# Patient Record
Sex: MALE | Race: WHITE | Employment: OTHER | URBAN - METROPOLITAN AREA
[De-identification: names, ages, dates, MRNs, and addresses within clinical notes are randomized per-mention and may not be internally consistent; named-entity substitution may affect disease eponyms.]

---

## 2023-12-14 ENCOUNTER — APPOINTMENT (OUTPATIENT)
Dept: CARDIOLOGY | Facility: HOSPITAL | Age: 63
End: 2023-12-14
Payer: COMMERCIAL

## 2023-12-14 ENCOUNTER — HOSPITAL ENCOUNTER (EMERGENCY)
Facility: HOSPITAL | Age: 63
Discharge: HOME | End: 2023-12-14
Attending: EMERGENCY MEDICINE
Payer: COMMERCIAL

## 2023-12-14 ENCOUNTER — APPOINTMENT (OUTPATIENT)
Dept: RADIOLOGY | Facility: HOSPITAL | Age: 63
End: 2023-12-14
Payer: COMMERCIAL

## 2023-12-14 VITALS
DIASTOLIC BLOOD PRESSURE: 84 MMHG | HEART RATE: 85 BPM | TEMPERATURE: 97.6 F | SYSTOLIC BLOOD PRESSURE: 136 MMHG | WEIGHT: 190 LBS | RESPIRATION RATE: 18 BRPM | BODY MASS INDEX: 25.73 KG/M2 | OXYGEN SATURATION: 97 % | HEIGHT: 72 IN

## 2023-12-14 DIAGNOSIS — R55 SYNCOPE, UNSPECIFIED SYNCOPE TYPE: Primary | ICD-10-CM

## 2023-12-14 DIAGNOSIS — N28.1 RENAL CYST: ICD-10-CM

## 2023-12-14 DIAGNOSIS — K76.89 HEPATIC CYST: ICD-10-CM

## 2023-12-14 LAB
ALBUMIN SERPL BCP-MCNC: 4.3 G/DL (ref 3.4–5)
ALP SERPL-CCNC: 64 U/L (ref 33–136)
ALT SERPL W P-5'-P-CCNC: 88 U/L (ref 10–52)
ANION GAP SERPL CALC-SCNC: 15 MMOL/L (ref 10–20)
AST SERPL W P-5'-P-CCNC: 47 U/L (ref 9–39)
BASOPHILS # BLD AUTO: 0.03 X10*3/UL (ref 0–0.1)
BASOPHILS NFR BLD AUTO: 0.4 %
BILIRUB DIRECT SERPL-MCNC: 0.1 MG/DL (ref 0–0.3)
BILIRUB SERPL-MCNC: 0.8 MG/DL (ref 0–1.2)
BUN SERPL-MCNC: 15 MG/DL (ref 6–23)
CALCIUM SERPL-MCNC: 9.2 MG/DL (ref 8.6–10.3)
CARDIAC TROPONIN I PNL SERPL HS: 4 NG/L (ref 0–20)
CHLORIDE SERPL-SCNC: 102 MMOL/L (ref 98–107)
CO2 SERPL-SCNC: 26 MMOL/L (ref 21–32)
CREAT SERPL-MCNC: 0.93 MG/DL (ref 0.5–1.3)
EOSINOPHIL # BLD AUTO: 0.09 X10*3/UL (ref 0–0.7)
EOSINOPHIL NFR BLD AUTO: 1.3 %
ERYTHROCYTE [DISTWIDTH] IN BLOOD BY AUTOMATED COUNT: 12.2 % (ref 11.5–14.5)
GFR SERPL CREATININE-BSD FRML MDRD: >90 ML/MIN/1.73M*2
GLUCOSE SERPL-MCNC: 157 MG/DL (ref 74–99)
HCT VFR BLD AUTO: 40.6 % (ref 41–52)
HGB BLD-MCNC: 14.2 G/DL (ref 13.5–17.5)
IMM GRANULOCYTES # BLD AUTO: 0.01 X10*3/UL (ref 0–0.7)
IMM GRANULOCYTES NFR BLD AUTO: 0.1 % (ref 0–0.9)
LYMPHOCYTES # BLD AUTO: 2.26 X10*3/UL (ref 1.2–4.8)
LYMPHOCYTES NFR BLD AUTO: 33.8 %
MCH RBC QN AUTO: 28.7 PG (ref 26–34)
MCHC RBC AUTO-ENTMCNC: 35 G/DL (ref 32–36)
MCV RBC AUTO: 82 FL (ref 80–100)
MONOCYTES # BLD AUTO: 0.51 X10*3/UL (ref 0.1–1)
MONOCYTES NFR BLD AUTO: 7.6 %
NEUTROPHILS # BLD AUTO: 3.78 X10*3/UL (ref 1.2–7.7)
NEUTROPHILS NFR BLD AUTO: 56.8 %
NRBC BLD-RTO: 0 /100 WBCS (ref 0–0)
PLATELET # BLD AUTO: 243 X10*3/UL (ref 150–450)
POTASSIUM SERPL-SCNC: 3.5 MMOL/L (ref 3.5–5.3)
PROT SERPL-MCNC: 7.3 G/DL (ref 6.4–8.2)
RBC # BLD AUTO: 4.94 X10*6/UL (ref 4.5–5.9)
SODIUM SERPL-SCNC: 139 MMOL/L (ref 136–145)
WBC # BLD AUTO: 6.7 X10*3/UL (ref 4.4–11.3)

## 2023-12-14 PROCEDURE — 2550000001 HC RX 255 CONTRASTS: Performed by: EMERGENCY MEDICINE

## 2023-12-14 PROCEDURE — 74177 CT ABD & PELVIS W/CONTRAST: CPT

## 2023-12-14 PROCEDURE — 84484 ASSAY OF TROPONIN QUANT: CPT | Performed by: EMERGENCY MEDICINE

## 2023-12-14 PROCEDURE — 82248 BILIRUBIN DIRECT: CPT | Performed by: EMERGENCY MEDICINE

## 2023-12-14 PROCEDURE — 99284 EMERGENCY DEPT VISIT MOD MDM: CPT | Mod: 25 | Performed by: EMERGENCY MEDICINE

## 2023-12-14 PROCEDURE — 93005 ELECTROCARDIOGRAM TRACING: CPT

## 2023-12-14 PROCEDURE — 85025 COMPLETE CBC W/AUTO DIFF WBC: CPT | Performed by: EMERGENCY MEDICINE

## 2023-12-14 PROCEDURE — 80048 BASIC METABOLIC PNL TOTAL CA: CPT | Performed by: EMERGENCY MEDICINE

## 2023-12-14 PROCEDURE — 36415 COLL VENOUS BLD VENIPUNCTURE: CPT | Performed by: EMERGENCY MEDICINE

## 2023-12-14 PROCEDURE — 74177 CT ABD & PELVIS W/CONTRAST: CPT | Performed by: STUDENT IN AN ORGANIZED HEALTH CARE EDUCATION/TRAINING PROGRAM

## 2023-12-14 RX ADMIN — IOHEXOL 75 ML: 350 INJECTION, SOLUTION INTRAVENOUS at 20:43

## 2023-12-14 ASSESSMENT — COLUMBIA-SUICIDE SEVERITY RATING SCALE - C-SSRS
6. HAVE YOU EVER DONE ANYTHING, STARTED TO DO ANYTHING, OR PREPARED TO DO ANYTHING TO END YOUR LIFE?: NO
2. HAVE YOU ACTUALLY HAD ANY THOUGHTS OF KILLING YOURSELF?: NO
1. IN THE PAST MONTH, HAVE YOU WISHED YOU WERE DEAD OR WISHED YOU COULD GO TO SLEEP AND NOT WAKE UP?: NO

## 2023-12-15 NOTE — ED PROVIDER NOTES
Chief Complaint   Patient presents with    Syncope     Pt was shopping with his wife, became dizzy/diaphoretic and was lowered to the floor, pt denies every losing consciousness, per EMS CPR may have been initiated by someone but pt does remember entire event and and denies that this happened. Pt denies CP/SOB, states he is feeling better at this time      History of Present Illness   63-year-old presents for evaluation of near syncope/syncope.  Patient reports that he was sitting down waiting for his wife who was shopping when he all of a sudden became severely diaphoretic and lightheaded and experienced lower abdominal discomfort.  No associated chest pain or shortness of breath.  Not entirely sure whether he completely lost consciousness.  Currently states that he feels not entirely back to normal but greatly improved compared to earlier.  Reports history of hypertension and hyperlipidemia however states that he is in a very good and physical shape and typically can ride a bike 40 miles without difficulty.  Wearing a Fitbit device and reports that his heart rate while sitting down around this time was greater than 120 bpm compared to his usual resting heart rate of 60s.    Physical Exam     ED Triage Vitals [12/14/23 1827]   Temp Heart Rate Resp BP   36.4 °C (97.6 °F) 98 18 (!) 133/93      SpO2 Temp src Heart Rate Source Patient Position   97 % -- -- --      BP Location FiO2 (%)     -- --         General:  No acute distress.  Head: Atraumatic.  Eyes: No conjunctival injection.   Ears Nose Throat: Hearing grossly intact. No epistaxis. Oral mucosa moist.  Neck: Supple.  Cardiovascular: Extremities warm.  Regular rhythm.  No murmur.  No lower leg edema.  Pulmonary: No stridor. Normal respiratory effort.  Lungs clear.  Abdominal: No abdominal distention.  Abdomen soft and nontender.  Musculoskeletal: No deformity or joint swelling.  Skin: No rash.  Psychiatric: Does not appear to respond to internal  stimuli.  Neurologic: Alert. Follows directions. Face symmetric. No aphasia or dysarthria. Symmetric movement of upper and lower extremities.    ED Course & Medical Decision Making   Vital signs within normal limits.  Patient well-appearing and asymptomatic.  Labs demonstrated mild hyperglycemia glucose 157 otherwise normal metabolic panel.  ALT and AST very mildly elevated but bilirubin and direct bilirubin normal.  High-sensitivity troponin negative.  In setting of reported lower abdominal discomfort and near syncope or syncope ordered CT of the abdomen pelvis with IV contrast to exclude abdominal aortic aneurysm.  CT demonstrated no evidence of acute abnormality of the abdomen or pelvis.  Several bilateral renal cyst and hepatic cyst noted.  Discussed results with patient.  Recommended syncope workup as he described loss consciousness or near loss of consciousness while sitting and associated with diaphoresis.  Patient preferred not to be admitted to the hospital and states that he will follow-up with his primary care physician on Monday.  Encouraged to return to the emergency department if he changes mind or if new or worsening symptoms.  Discharged in good condition.    ED Course as of 12/14/23 2209   Thu Dec 14, 2023   1846 ECG 12 lead  EKG interpreted by me.  12/14/2023 at 1834.  Sinus rhythm 97 bpm.   QRS 96 QTc 425.  No ST elevation. [MC]      ED Course User Index  [MC] Yassine Peña MD         Diagnoses as of 12/14/23 2209   Renal cyst   Syncope, unspecified syncope type   Hepatic cyst            Yassine Peña MD  12/14/23 2209